# Patient Record
Sex: FEMALE | Race: WHITE
[De-identification: names, ages, dates, MRNs, and addresses within clinical notes are randomized per-mention and may not be internally consistent; named-entity substitution may affect disease eponyms.]

---

## 2018-08-24 ENCOUNTER — HOSPITAL ENCOUNTER (EMERGENCY)
Dept: HOSPITAL 80 - FED | Age: 23
Discharge: HOME | End: 2018-08-24
Payer: COMMERCIAL

## 2018-08-24 VITALS — DIASTOLIC BLOOD PRESSURE: 77 MMHG | SYSTOLIC BLOOD PRESSURE: 113 MMHG

## 2018-08-24 DIAGNOSIS — N39.0: Primary | ICD-10-CM

## 2018-08-24 NOTE — EDPHY
H & P


Time Seen by Provider: 08/24/18 20:26


HPI/ROS: 





HPI


Urinary complaints.





23-year-old female by private vehicle.  This patient reports she has had 

burning and increased frequency with urination since earlier this evening.  

Denies back pain.  No nausea or vomiting.  Denies vaginal bleeding.  No vaginal 

discharge.  No other complaints.





ROS:





Constitutional:  No fever, no chills.  No weakness.


Gastrointestinal:  No abdominal pain, no vomiting, no diarrhea.


Genitourinary:  No hematuria.  No dysuria or increased frequency with urination.


Musculoskeletal:  No back pain.  No neck pain. 


Skin:  No rashes.


Neurological:  No headache.  No focal weakness or altered sensation.





Past medical history:  No significant past medical history.  No allergy to 

medications.  She is on birth control.





Social history:  [employer].  Here by herself.  Nonsmoker.  No alcohol.





Physical Exam:





General Appearance:  Alert, no distress.  This patient is responding to 

questions appropriately and in full sentences.  This patient appears well-

hydrated and well-nourished.


Eyes:  Pupils equal and round no pallor or injection.  No lid edema, erythema 

or injection.


Gastrointestinal:  Abdomen is soft and nontender, no masses, bowel sounds 

normal.  No focal tenderness at McBurney's point.  No Elliott sign.


Neurological:  Motor sensory function is grossly intact.  Cranial nerves are 

normal.  Gait is normal.


Skin:  Warm and dry, no rashes.


Musculoskeletal:  Neck is supple and nontender.  No CVA tenderness bilaterally.


Extremities are symmetrical.  All joints range without pain or impingement.


Psychiatric:  No agitation.  No depression.





Database:





EKG:





Imaging:





Procedures:





Emergency department course:





Vital signs reviewed and are normal.  Urinalysis was significant for infection.

  She was started on Keflex in the emergency department.  She will be 

prescribed this for outpatient treatment for urinary tract infection.  She 

feels comfortable going home and I feel she is safe for discharge.  Follow-up 

and return to emergency department precautions reviewed with her.  All of her 

questions were answered.  She was discharged from the emergency department in 

good condition.





Differential Diagnosis:





The differential diagnosis on this patient includes but is not limited to 

simple UTI.  Pyelonephritis, STI unlikely This represents a partial list of 

diagnoses considered.  These considerations are based on history, physical exam

, past history, reassessment and diagnostic testing.


Smoking Status: Never smoked


Constitutional: 


 Initial Vital Signs











Temperature (C)  36.7 C   08/24/18 20:22


 


Heart Rate  92   08/24/18 20:22


 


Respiratory Rate  16   08/24/18 20:22


 


Blood Pressure  123/80 H  08/24/18 20:22


 


O2 Sat (%)  96   08/24/18 20:22








 











O2 Delivery Mode               Room Air














Allergies/Adverse Reactions: 


 





No Known Allergies Allergy (Unverified 08/24/18 20:25)


 








Home Medications: 














 Medication  Instructions  Recorded


 


Cephalexin [Keflex (*)] 500 mg PO Q6 5 Days  cap 08/24/18


 


Velia 28 Tablet  08/24/18














Medical Decision Making





- Data Points


Laboratory Results: 


 











  08/24/18 08/24/18





  20:27 20:27


 


Urine Color  YELLOW   





   


 


Urine Appearance  CLEAR   





   


 


Urine pH  5.0   





   (5.0-7.5)  


 


Ur Specific Gravity  1.027   





   (1.002-1.030)  


 


Urine Protein  NEGATIVE   





   (NEGATIVE)  


 


Urine Ketones  NEGATIVE   





   (NEGATIVE)  


 


Urine Blood  3+  H   





   (NEGATIVE)  


 


Urine Nitrate  NEGATIVE   





   (NEGATIVE)  


 


Urine Bilirubin  NEGATIVE   





   (NEGATIVE)  


 


Urine Urobilinogen  NEGATIVE EU EU  





   (0.2-1.0)  


 


Ur Leukocyte Esterase  2+  H   





   (NEGATIVE)  


 


Urine RBC  5-10 /hpf H /hpf  





   (0-3)  


 


Urine WBC   /hpf H /hpf  





   (0-3)  


 


Ur Epithelial Cells  1+ /lpf /lpf  





   (NONE-1+)  


 


Urine Mucus  1+ /lpf /lpf  





   (NONE-1+)  


 


Urine Glucose  NEGATIVE   





   (NEGATIVE)  


 


Urine Pregnancy Test    NEGATIVE 





   














Departure





- Departure


Disposition: Home, Routine, Self-Care


Clinical Impression: 


 Urinary tract infection





Condition: Good


Instructions:  Urinary Tract Infection in Women (ED)


Additional Instructions: 


Read and follow provided instructions.





Follow-up with your primary care physician on Monday for re-evaluation as 

needed.





Take medication as prescribed through entire course of treatment.





Return to the emergency department for worsening symptoms, back pain, high fever

, vomiting or other serious concerns.


Referrals: 


NONE *PRIMARY CARE P,. [Primary Care Provider] - As per Instructions


Prescriptions: 


Cephalexin [Keflex (*)] 500 mg PO Q6 5 Days  cap